# Patient Record
Sex: MALE | URBAN - METROPOLITAN AREA
[De-identification: names, ages, dates, MRNs, and addresses within clinical notes are randomized per-mention and may not be internally consistent; named-entity substitution may affect disease eponyms.]

---

## 2017-10-19 NOTE — PATIENT DISCUSSION
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 This condition nshould be controlled initially before treating any floaters. Pt elects to observe at this time.

## 2017-10-19 NOTE — PATIENT DISCUSSION
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 Recommend aspirin and alphagan drops twice a day. Color vision is not good, which is a common side effect of this condition. Avoid BP meds at night, take in the morning. If blood pressure gets low, this could occur in the other eye.

## 2017-10-19 NOTE — PATIENT DISCUSSION
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 Will prescribe IOP lowering drops. Pt will follow with Dr. 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 Stressed good BP/BS control to patient.

## 2017-11-15 NOTE — PATIENT DISCUSSION
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

## 2017-11-15 NOTE — PATIENT DISCUSSION
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 Pt educated that they will need glasses for all focal points after surgery.

## 2017-11-15 NOTE — PATIENT DISCUSSION
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 Will prescribe IOP lowering drops. Pt will follow with Dr. Raleigh Hatchet for HVF and OCT RNFL OS. Stressed good BP/BS control to patient.

## 2017-12-08 NOTE — PROCEDURE NOTE: SURGICAL
Prior to commencing surgery patient identification, surgical procedure, site, and side were confirmed by Dr. Mckenzie Powers. Following topical proparacaine anesthesia, the patient was positioned at the YAG laser, a contact lens coupled to the cornea with methylcellulose and an axial posterior capsulotomy performed without complication using 2.7 Mj x 32. One drop of Alphagan was instilled and the patient returned to the holding area having tolerated the procedure well and without complication. <br />

## 2017-12-08 NOTE — PATIENT DISCUSSION
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 Will prescribe IOP lowering drops. Pt will follow with Dr. Huber Cooper for HVF and OCT RNFL OS. Stressed good BP/BS control to patient.

## 2017-12-14 NOTE — PATIENT DISCUSSION
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 Will prescribe IOP lowering drops. Pt will follow with Dr. Salma Escalona for HVF and OCT RNFL OS. Stressed good BP/BS control to patient.

## 2017-12-21 NOTE — PATIENT DISCUSSION
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 Will prescribe IOP lowering drops. Pt will follow with Dr. Talon Crews for HVF and OCT RNFL OS. Stressed good BP/BS control to patient.

## 2018-01-12 NOTE — PATIENT DISCUSSION
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 Will prescribe IOP lowering drops. Pt will follow with Dr. 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 Stressed good BP/BS control to patient.

## 2019-10-16 NOTE — PATIENT DISCUSSION
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  PED, no fluid, no conversion to wet.

## 2019-12-10 ENCOUNTER — IMPORTED ENCOUNTER (OUTPATIENT)
Dept: URBAN - METROPOLITAN AREA CLINIC 9 | Facility: CLINIC | Age: 79
End: 2019-12-10

## 2020-01-06 ENCOUNTER — IMPORTED ENCOUNTER (OUTPATIENT)
Dept: URBAN - METROPOLITAN AREA CLINIC 9 | Facility: CLINIC | Age: 80
End: 2020-01-06

## 2020-01-31 ENCOUNTER — IMPORTED ENCOUNTER (OUTPATIENT)
Dept: URBAN - METROPOLITAN AREA CLINIC 9 | Facility: CLINIC | Age: 80
End: 2020-01-31

## 2021-10-19 ASSESSMENT — VISUAL ACUITY
OS_SC: 20/40 SN
OS_SC: 20/40 SN
OS_CC: 20/25 SN
OD_CC: 20/40 SN
OD_CC: 20/20 SN
OD_SC: 20/50 SN
OS_CC: 20/30 SN
OS_CC: 20/40 SN
OD_SC: 20/40 SN

## 2021-10-19 ASSESSMENT — TONOMETRY
OS_IOP_MMHG: 14
OD_IOP_MMHG: 13
OS_IOP_MMHG: 13
OD_IOP_MMHG: 12
OD_IOP_MMHG: 14
OS_IOP_MMHG: 16

## 2021-10-19 ASSESSMENT — KERATOMETRY
OS_K2POWER_DIOPTERS: 45
OS_K1POWER_DIOPTERS: 44
OD_AXISANGLE2_DEGREES: 87
OS_AXISANGLE2_DEGREES: 99
OD_K2POWER_DIOPTERS: 45
OS_AXISANGLE_DEGREES: 9
OD_AXISANGLE_DEGREES: 177
OD_K1POWER_DIOPTERS: 44